# Patient Record
Sex: MALE | Race: WHITE | HISPANIC OR LATINO | Employment: UNEMPLOYED | URBAN - METROPOLITAN AREA
[De-identification: names, ages, dates, MRNs, and addresses within clinical notes are randomized per-mention and may not be internally consistent; named-entity substitution may affect disease eponyms.]

---

## 2023-06-11 ENCOUNTER — HOSPITAL ENCOUNTER (EMERGENCY)
Facility: HOSPITAL | Age: 28
Discharge: HOME | End: 2023-06-11
Attending: EMERGENCY MEDICINE | Admitting: EMERGENCY MEDICINE

## 2023-06-11 VITALS
DIASTOLIC BLOOD PRESSURE: 72 MMHG | RESPIRATION RATE: 18 BRPM | HEIGHT: 71 IN | SYSTOLIC BLOOD PRESSURE: 116 MMHG | OXYGEN SATURATION: 99 % | BODY MASS INDEX: 30.8 KG/M2 | WEIGHT: 220 LBS | TEMPERATURE: 98.7 F | HEART RATE: 83 BPM

## 2023-06-11 DIAGNOSIS — K04.7 DENTAL INFECTION: ICD-10-CM

## 2023-06-11 DIAGNOSIS — R22.0 FACIAL SWELLING: ICD-10-CM

## 2023-06-11 PROCEDURE — 99284 EMERGENCY DEPT VISIT MOD MDM: CPT

## 2023-06-11 RX ORDER — PENICILLIN V POTASSIUM 500 MG/1
500 TABLET, FILM COATED ORAL 4 TIMES DAILY
Qty: 40 TABLET | Refills: 0 | Status: SHIPPED | OUTPATIENT
Start: 2023-06-11 | End: 2023-06-21

## 2023-06-11 RX ORDER — HYDROCODONE BITARTRATE AND ACETAMINOPHEN 5; 325 MG/1; MG/1
1 TABLET ORAL EVERY 6 HOURS PRN
Qty: 8 TABLET | Refills: 0 | Status: SHIPPED | OUTPATIENT
Start: 2023-06-11 | End: 2023-06-14
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
## 2023-06-11 NOTE — DISCHARGE INSTRUCTIONS
Your symptoms today appear to be due to a dental infection.  Take the prescribed antibiotics as directed and follow-up closely with your dentist as soon as possible.  Return to the emergency department if you develop any worsening symptoms or if you have any other concerns.

## 2023-06-11 NOTE — ED TRIAGE NOTES
Right mouth swelling onset this morning.      Triage Assessment     Row Name 06/11/23 1600       Triage Assessment (Adult)    Airway WDL WDL       Respiratory WDL    Respiratory WDL WDL       Skin Circulation/Temperature WDL    Skin Circulation/Temperature WDL WDL       Cardiac WDL    Cardiac WDL WDL       Peripheral/Neurovascular WDL    Peripheral Neurovascular WDL WDL       Cognitive/Neuro/Behavioral WDL    Cognitive/Neuro/Behavioral WDL WDL